# Patient Record
Sex: MALE | URBAN - METROPOLITAN AREA
[De-identification: names, ages, dates, MRNs, and addresses within clinical notes are randomized per-mention and may not be internally consistent; named-entity substitution may affect disease eponyms.]

---

## 2018-09-11 ENCOUNTER — TELEPHONE (OUTPATIENT)
Dept: TRANSPLANT | Facility: CLINIC | Age: 63
End: 2018-09-11

## 2018-09-11 NOTE — TELEPHONE ENCOUNTER
----- Message from Javier Salas sent at 2018 12:48 PM CDT -----  Returned pt step siter call and told her that we rec'sushil info with the pt insur info,but we couldn't make out the any of the info. She will e-mail them [email or web address]. Also explained the ref process to her.  ----------------------------------------------------------------------------------------  Nan Cisse sent to [facility] Liver Referral Pool  Caller: Jessie Candelaria [phone] (Today, 11:31 AM)         Calling to speak with Frieda regarding NP Alvaro SAPP [birth year]     Caller states she spoke to Frieda previously and would like her to contact her

## 2018-09-11 NOTE — TELEPHONE ENCOUNTER
----- Message from Javier Salas sent at 9/11/2018  2:26 PM CDT -----  Called pt step sister and told her that I did not have the e-mail yet. LVM for her to call back and leave her e-mail address, so she can send us his insur info.

## 2018-09-11 NOTE — TELEPHONE ENCOUNTER
----- Message from Javier Salas sent at 9/11/2018  4:23 PM CDT -----  Have pt insur info, but still need his mailing address and phone number. Pt step sister will send  
Head Injury

## 2018-09-26 ENCOUNTER — TELEPHONE (OUTPATIENT)
Dept: TRANSPLANT | Facility: CLINIC | Age: 63
End: 2018-09-26

## 2018-09-26 NOTE — TELEPHONE ENCOUNTER
Patient's step sister Ms. Candelaria called to request that patient be evaluated here for liver transplant. Patient is currently inpatient in [place]. In reviewing records, patient was admitted with sepsis. [place] [facility]  due to debility. Patient has been in SNF facility. Hospice was recommended, but step sister is requesting transplant. Notified Dr. Pacheco. Advised Ms. Candelaria that she will need to have his doctor call Dr. Danielle who is on call for hepatology to discuss patients' case. Patient may be too sick to transfer. Gave Ms. Candelaria phone number for [facility] operators.

## 2018-09-27 ENCOUNTER — TELEPHONE (OUTPATIENT)
Dept: TRANSPLANT | Facility: CLINIC | Age: 63
End: 2018-09-27

## 2018-09-27 NOTE — TELEPHONE ENCOUNTER
Call from transfer center requesting transfer for liver transplant eval. Pt denied at [facility] [place], [facility], [place]     I was with Lesly Corrales and Junior, we discussed the case and agreed to decline the transfer due to frailty/high medical/surgical risk.   Thank you.   Moises